# Patient Record
Sex: FEMALE | Race: WHITE | NOT HISPANIC OR LATINO | Employment: FULL TIME | URBAN - METROPOLITAN AREA
[De-identification: names, ages, dates, MRNs, and addresses within clinical notes are randomized per-mention and may not be internally consistent; named-entity substitution may affect disease eponyms.]

---

## 2023-03-10 PROBLEM — R60.0 EDEMA OF LOWER EXTREMITY: Status: ACTIVE | Noted: 2023-03-10

## 2023-03-10 PROBLEM — M75.20 BICEPS TENDONITIS: Status: ACTIVE | Noted: 2023-03-10

## 2023-03-10 PROBLEM — M25.879 ANKLE IMPINGEMENT SYNDROME: Status: ACTIVE | Noted: 2023-03-10

## 2023-03-10 PROBLEM — M75.100 ROTATOR CUFF TEAR: Status: ACTIVE | Noted: 2023-03-10

## 2023-03-10 PROBLEM — M54.2 NECK PAIN: Status: ACTIVE | Noted: 2023-03-10

## 2023-03-10 PROBLEM — M75.80 ROTATOR CUFF TENDINITIS: Status: ACTIVE | Noted: 2023-03-10

## 2023-03-10 PROBLEM — M19.079 PRIMARY LOCALIZED OSTEOARTHROSIS OF ANKLE AND FOOT: Status: ACTIVE | Noted: 2023-03-10

## 2023-03-10 PROBLEM — R00.2 HEART PALPITATIONS: Status: ACTIVE | Noted: 2023-03-10

## 2023-03-10 PROBLEM — R39.15 URGENCY OF URINATION: Status: ACTIVE | Noted: 2023-03-10

## 2023-03-10 PROBLEM — M79.673 PAIN IN HEEL: Status: ACTIVE | Noted: 2023-03-10

## 2023-03-10 PROBLEM — M17.10 OSTEOARTHROSIS, LOCALIZED, PRIMARY, INVOLVING LOWER LEG: Status: ACTIVE | Noted: 2023-03-10

## 2023-03-10 PROBLEM — M93.20 OSTEOCHONDRITIS DISSECANS: Status: ACTIVE | Noted: 2023-03-10

## 2023-03-10 PROBLEM — R01.1 HEART MURMUR: Status: ACTIVE | Noted: 2023-03-10

## 2023-03-10 PROBLEM — M23.90 INTERNAL DERANGEMENT OF KNEE: Status: ACTIVE | Noted: 2023-03-10

## 2023-03-10 PROBLEM — I10 ESSENTIAL HYPERTENSION: Status: ACTIVE | Noted: 2023-03-10

## 2023-03-10 PROBLEM — R32 URINARY INCONTINENCE IN FEMALE: Status: ACTIVE | Noted: 2023-03-10

## 2023-03-10 PROBLEM — I48.0 PAROXYSMAL ATRIAL FIBRILLATION (MULTI): Status: ACTIVE | Noted: 2023-03-10

## 2023-03-10 PROBLEM — R20.0 NUMBNESS IN FEET: Status: ACTIVE | Noted: 2023-03-10

## 2023-03-10 PROBLEM — S92.309A CLOSED FRACTURE OF METATARSAL BONE: Status: ACTIVE | Noted: 2023-03-10

## 2023-03-10 PROBLEM — S92.531A CLOSED DISPLACED FRACTURE OF DISTAL PHALANX OF LESSER TOE OF RIGHT FOOT: Status: ACTIVE | Noted: 2023-03-10

## 2023-03-10 PROBLEM — M25.569 KNEE PAIN: Status: ACTIVE | Noted: 2023-03-10

## 2023-03-10 PROBLEM — I47.19 ATRIAL TACHYCARDIA, PAROXYSMAL (CMS-HCC): Status: ACTIVE | Noted: 2023-03-10

## 2023-03-10 PROBLEM — S92.919A TOE FRACTURE: Status: ACTIVE | Noted: 2023-03-10

## 2023-03-10 PROBLEM — I47.10 PAROXYSMAL SVT (SUPRAVENTRICULAR TACHYCARDIA) (CMS-HCC): Status: ACTIVE | Noted: 2023-03-10

## 2023-03-10 PROBLEM — M25.819 SHOULDER IMPINGEMENT: Status: ACTIVE | Noted: 2023-03-10

## 2023-03-10 PROBLEM — M25.519 SHOULDER PAIN: Status: ACTIVE | Noted: 2023-03-10

## 2023-03-10 PROBLEM — M25.473 ANKLE EFFUSION: Status: ACTIVE | Noted: 2023-03-10

## 2023-03-10 PROBLEM — M25.579 ANKLE JOINT PAIN: Status: ACTIVE | Noted: 2023-03-10

## 2023-03-10 PROBLEM — N95.2 VAGINAL ATROPHY: Status: ACTIVE | Noted: 2023-03-10

## 2023-03-10 PROBLEM — R04.0 FREQUENT EPISTAXIS: Status: ACTIVE | Noted: 2023-03-10

## 2023-03-10 RX ORDER — DILTIAZEM HYDROCHLORIDE 120 MG/1
1 CAPSULE, EXTENDED RELEASE ORAL DAILY
COMMUNITY
Start: 2020-12-30 | End: 2024-02-07 | Stop reason: SDUPTHER

## 2023-03-10 RX ORDER — VALSARTAN 80 MG/1
120 TABLET ORAL DAILY
COMMUNITY
End: 2023-11-28

## 2023-03-10 RX ORDER — FLECAINIDE ACETATE 50 MG/1
1 TABLET ORAL 2 TIMES DAILY
COMMUNITY
Start: 2022-03-29 | End: 2023-11-28

## 2023-03-10 RX ORDER — HYDROCHLOROTHIAZIDE 12.5 MG/1
12.5 CAPSULE ORAL DAILY
COMMUNITY
End: 2024-03-11

## 2023-03-14 LAB
ANION GAP IN SER/PLAS: 11 MMOL/L (ref 10–20)
CALCIUM (MG/DL) IN SER/PLAS: 9.8 MG/DL (ref 8.6–10.3)
CARBON DIOXIDE, TOTAL (MMOL/L) IN SER/PLAS: 30 MMOL/L (ref 21–32)
CHLORIDE (MMOL/L) IN SER/PLAS: 104 MMOL/L (ref 98–107)
CHOLESTEROL (MG/DL) IN SER/PLAS: 184 MG/DL (ref 0–199)
CHOLESTEROL IN HDL (MG/DL) IN SER/PLAS: 50.6 MG/DL
CHOLESTEROL/HDL RATIO: 3.6
CREATININE (MG/DL) IN SER/PLAS: 1.02 MG/DL (ref 0.5–1.05)
ERYTHROCYTE DISTRIBUTION WIDTH (RATIO) BY AUTOMATED COUNT: 13.9 % (ref 11.5–14.5)
ERYTHROCYTE MEAN CORPUSCULAR HEMOGLOBIN CONCENTRATION (G/DL) BY AUTOMATED: 31.7 G/DL (ref 32–36)
ERYTHROCYTE MEAN CORPUSCULAR VOLUME (FL) BY AUTOMATED COUNT: 84 FL (ref 80–100)
ERYTHROCYTES (10*6/UL) IN BLOOD BY AUTOMATED COUNT: 4.09 X10E12/L (ref 4–5.2)
GFR FEMALE: 62 ML/MIN/1.73M2
GLUCOSE (MG/DL) IN SER/PLAS: 82 MG/DL (ref 74–99)
HEMATOCRIT (%) IN BLOOD BY AUTOMATED COUNT: 34.4 % (ref 36–46)
HEMOGLOBIN (G/DL) IN BLOOD: 10.9 G/DL (ref 12–16)
LDL: 116 MG/DL (ref 0–99)
LEUKOCYTES (10*3/UL) IN BLOOD BY AUTOMATED COUNT: 4.4 X10E9/L (ref 4.4–11.3)
MAGNESIUM (MG/DL) IN SER/PLAS: 2 MG/DL (ref 1.6–2.4)
PLATELETS (10*3/UL) IN BLOOD AUTOMATED COUNT: 283 X10E9/L (ref 150–450)
POTASSIUM (MMOL/L) IN SER/PLAS: 3.8 MMOL/L (ref 3.5–5.3)
SODIUM (MMOL/L) IN SER/PLAS: 141 MMOL/L (ref 136–145)
TRIGLYCERIDE (MG/DL) IN SER/PLAS: 86 MG/DL (ref 0–149)
UREA NITROGEN (MG/DL) IN SER/PLAS: 15 MG/DL (ref 6–23)
VLDL: 17 MG/DL (ref 0–40)

## 2023-03-19 ASSESSMENT — PROMIS GLOBAL HEALTH SCALE
RATE_AVERAGE_PAIN: 1
RATE_GENERAL_HEALTH: VERY GOOD
CARRYOUT_PHYSICAL_ACTIVITIES: COMPLETELY
RATE_MENTAL_HEALTH: EXCELLENT
CARRYOUT_SOCIAL_ACTIVITIES: EXCELLENT
RATE_QUALITY_OF_LIFE: EXCELLENT
RATE_AVERAGE_FATIGUE: MILD
EMOTIONAL_PROBLEMS: RARELY
RATE_PHYSICAL_HEALTH: VERY GOOD
RATE_SOCIAL_SATISFACTION: VERY GOOD

## 2023-03-21 ENCOUNTER — OFFICE VISIT (OUTPATIENT)
Dept: PRIMARY CARE | Facility: CLINIC | Age: 63
End: 2023-03-21
Payer: COMMERCIAL

## 2023-03-21 VITALS
BODY MASS INDEX: 28.25 KG/M2 | SYSTOLIC BLOOD PRESSURE: 120 MMHG | DIASTOLIC BLOOD PRESSURE: 70 MMHG | WEIGHT: 180 LBS | HEIGHT: 67 IN

## 2023-03-21 DIAGNOSIS — R20.0 NUMBNESS IN FEET: ICD-10-CM

## 2023-03-21 DIAGNOSIS — Z00.00 HEALTH CARE MAINTENANCE: Primary | ICD-10-CM

## 2023-03-21 DIAGNOSIS — I10 ESSENTIAL HYPERTENSION: ICD-10-CM

## 2023-03-21 DIAGNOSIS — I10 HYPERTENSION, UNSPECIFIED TYPE: ICD-10-CM

## 2023-03-21 DIAGNOSIS — R76.8 POSITIVE SM/RNP ANTIBODY: ICD-10-CM

## 2023-03-21 DIAGNOSIS — I48.0 PAROXYSMAL ATRIAL FIBRILLATION (MULTI): ICD-10-CM

## 2023-03-21 DIAGNOSIS — Z12.31 OTHER SCREENING MAMMOGRAM: ICD-10-CM

## 2023-03-21 DIAGNOSIS — R00.2 HEART PALPITATIONS: ICD-10-CM

## 2023-03-21 DIAGNOSIS — R43.0 ANOSMIA: ICD-10-CM

## 2023-03-21 DIAGNOSIS — M51.36 BULGING LUMBAR DISC: ICD-10-CM

## 2023-03-21 DIAGNOSIS — I47.19 ATRIAL TACHYCARDIA, PAROXYSMAL (CMS-HCC): ICD-10-CM

## 2023-03-21 PROCEDURE — 3078F DIAST BP <80 MM HG: CPT | Performed by: STUDENT IN AN ORGANIZED HEALTH CARE EDUCATION/TRAINING PROGRAM

## 2023-03-21 PROCEDURE — 1036F TOBACCO NON-USER: CPT | Performed by: STUDENT IN AN ORGANIZED HEALTH CARE EDUCATION/TRAINING PROGRAM

## 2023-03-21 PROCEDURE — 99204 OFFICE O/P NEW MOD 45 MIN: CPT | Performed by: STUDENT IN AN ORGANIZED HEALTH CARE EDUCATION/TRAINING PROGRAM

## 2023-03-21 PROCEDURE — 3074F SYST BP LT 130 MM HG: CPT | Performed by: STUDENT IN AN ORGANIZED HEALTH CARE EDUCATION/TRAINING PROGRAM

## 2023-03-21 ASSESSMENT — ENCOUNTER SYMPTOMS
SLEEP DISTURBANCE: 0
ABDOMINAL PAIN: 0
VOMITING: 0
SHORTNESS OF BREATH: 0
DIARRHEA: 0
FATIGUE: 1
NERVOUS/ANXIOUS: 1
DYSURIA: 0
SORE THROAT: 0
COUGH: 0
EYE PAIN: 0
CONSTIPATION: 0
NAUSEA: 1
UNEXPECTED WEIGHT CHANGE: 0
LIGHT-HEADEDNESS: 0
BACK PAIN: 0
DIZZINESS: 0
APPETITE CHANGE: 0

## 2023-03-21 NOTE — PROGRESS NOTES
"Subjective   Patient ID: Aristeo Hidalgo is a 62 y.o. female who presents for Referral.    Patient presents for elevated blood pressures and to establish a PCP. She records her blood pressures at home and they have been intermittently elevated this month. She has previously had elevated blood pressures at office visits, attributed to white coat hypertension, while other times has had normal blood pressures. Patient also notes of occasional nausea, fatigue and feeling \"off\" in her head this month. For the past few months, she has been experiencing intermittent shakiness and \"heart pounding\", but this has become worse this month. She is experiencing anxiety related to her symptoms and why her blood pressures are elevated, as she is worried she will have a stroke. The patient follows with Dr. Ramicone for pSVT and atrial tachycardia, for which she is on flecainide for, but no recent changes in dose. She saw cardiology this month, was started on valsartan and hydrochlorothiazide, and then had her valsartan dose increased 3 days ago. Denies sleep disturbance, chest pain, dyspnea, vomiting, dizziness, lightheadedness, diarrhea, change in weight, abdominal pain.     PMH: pSVT, atrial tachycardia, osteoarthritis  PSH: ovarian cyst removal, mandibular osteotomy, , arthroscopy of shoulder  SH: never smoker, drinks alcohol once a month, no drugs. 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 Goes to gym, active.   FH: skin cancer (mother), GI cancer (father), testicular cancer (brother), prostate cancer (brother)         Review of Systems   Constitutional:  Positive for fatigue. Negative for appetite change and unexpected weight change.   HENT:  Negative for sore throat.    Eyes:  Negative for pain.   Respiratory:  Negative for cough and shortness of breath.    Cardiovascular:  Negative for chest pain and leg swelling.        Heart pounding   Gastrointestinal:  Positive for nausea. Negative for abdominal pain, " "constipation, diarrhea and vomiting.   Genitourinary:  Negative for dysuria.   Musculoskeletal:  Negative for back pain.   Neurological:  Negative for dizziness, syncope and light-headedness.   Psychiatric/Behavioral:  Negative for sleep disturbance. The patient is nervous/anxious.    All other systems reviewed and are negative.      Objective   /70   Ht 1.702 m (5' 7\")   Wt 81.6 kg (180 lb)   BMI 28.19 kg/m²     Physical Exam  Vitals reviewed.   Constitutional:       General: She is not in acute distress.  HENT:      Head: Normocephalic and atraumatic.      Right Ear: External ear normal.      Left Ear: External ear normal.      Mouth/Throat:      Mouth: Mucous membranes are moist.   Eyes:      Conjunctiva/sclera: Conjunctivae normal.   Cardiovascular:      Rate and Rhythm: Normal rate and regular rhythm.      Heart sounds: Normal heart sounds.   Pulmonary:      Effort: Pulmonary effort is normal.      Breath sounds: Normal breath sounds.   Abdominal:      General: There is no distension.      Palpations: Abdomen is soft.      Tenderness: There is no abdominal tenderness.   Musculoskeletal:         General: No swelling.   Skin:     General: Skin is warm and dry.   Neurological:      General: No focal deficit present.      Mental Status: She is alert and oriented to person, place, and time.   Psychiatric:         Behavior: Behavior normal.         Assessment/Plan   Problem List Items Addressed This Visit          Circulatory    Atrial tachycardia, paroxysmal (CMS/HCC)    Essential hypertension    Heart palpitations    Paroxysmal atrial fibrillation (CMS/HCC)     Other Visit Diagnoses       Health care maintenance    -  Primary    Relevant Orders    TSH with reflex to Free T4 if abnormal    Vitamin D 25-Hydroxy,Total    Referral to Gynecology    Other screening mammogram        Relevant Orders    BI mammo bilateral screening tomosynthesis    Hypertension, unspecified type        Relevant Orders    Basic " "metabolic panel    Anosmia        Relevant Orders    MR brain wo IV contrast          #Hypertension  -Blood pressures well controlled currently, may have some component of anxiety contributing as well. Continue diltiazem. Was started on valsartan and hydrochlorothiazide this month by cardiology.   -Order BMP, TSH  -consider workup for secondary causes if poorly controlled    #pSVT and atrial tachycardia  -Follows with Dr. Ramicone. On flecainide and diltiazem.  Could be related to underlying symptoms, consider cardiac event monitor follow [facility] cardiology    #anosmnia  #numbness  Patient having non specific symptoms, feels overall unwell and \"off\" does endorse some vision changes at times that comes and goes, as well as non specific neurological symptoms. Has had full cardiac workup and follows with cardiology, could be stress/anxiety related however, pt has not had recent stressor or no changes in events  Obtain MRI brain, encourage to monitor sympotms and to call if she is to have new onset sympotms or can correlate with specific incidence    #Health maintenance  -Labs as above, and vitamin D. Recent labs from cardiology office this month reviewed, no significant abnormalities.   -Has not had her shingles vaccine. UTD on COVID.   -Believes her next colonoscopy is due in 2 years. Unknown last mammogram and pap, \"a few years ago\". Will order repeat mammogram and GYN referral.      RTC in 3-6 months or prn      "

## 2023-03-24 ENCOUNTER — LAB (OUTPATIENT)
Dept: LAB | Facility: LAB | Age: 63
End: 2023-03-24
Payer: COMMERCIAL

## 2023-03-24 DIAGNOSIS — Z00.00 HEALTH CARE MAINTENANCE: ICD-10-CM

## 2023-03-24 DIAGNOSIS — I10 HYPERTENSION, UNSPECIFIED TYPE: ICD-10-CM

## 2023-03-24 LAB
ANION GAP IN SER/PLAS: 13 MMOL/L (ref 10–20)
ANTI-CENTROMERE: <0.2 AI
ANTI-CHROMATIN: <0.2 AI
ANTI-DNA (DS): <1 IU/ML
ANTI-JO-1 IGG: <0.2 AI
ANTI-RIBOSOMAL P: <0.2 AI
ANTI-RNP: 1.3 AI
ANTI-SCL-70: <0.2 AI
ANTI-SM/RNP: <0.2 AI
ANTI-SM: <0.2 AI
ANTI-SSA: <0.2 AI
ANTI-SSB: <0.2 AI
CALCIDIOL (25 OH VITAMIN D3) (NG/ML) IN SER/PLAS: 29 NG/ML
CALCIUM (MG/DL) IN SER/PLAS: 9.5 MG/DL (ref 8.6–10.3)
CARBON DIOXIDE, TOTAL (MMOL/L) IN SER/PLAS: 28 MMOL/L (ref 21–32)
CHLORIDE (MMOL/L) IN SER/PLAS: 103 MMOL/L (ref 98–107)
COBALAMIN (VITAMIN B12) (PG/ML) IN SER/PLAS: 576 PG/ML (ref 211–911)
CREATININE (MG/DL) IN SER/PLAS: 0.86 MG/DL (ref 0.5–1.05)
FOLATE (NG/ML) IN SER/PLAS: >24 NG/ML
GFR FEMALE: 76 ML/MIN/1.73M2
GLUCOSE (MG/DL) IN SER/PLAS: 86 MG/DL (ref 74–99)
POTASSIUM (MMOL/L) IN SER/PLAS: 4.2 MMOL/L (ref 3.5–5.3)
SODIUM (MMOL/L) IN SER/PLAS: 140 MMOL/L (ref 136–145)
UREA NITROGEN (MG/DL) IN SER/PLAS: 21 MG/DL (ref 6–23)

## 2023-03-24 PROCEDURE — 86039 ANTINUCLEAR ANTIBODIES (ANA): CPT

## 2023-03-24 PROCEDURE — 86225 DNA ANTIBODY NATIVE: CPT

## 2023-03-24 PROCEDURE — 82306 VITAMIN D 25 HYDROXY: CPT

## 2023-03-24 PROCEDURE — 82607 VITAMIN B-12: CPT

## 2023-03-24 PROCEDURE — 86038 ANTINUCLEAR ANTIBODIES: CPT

## 2023-03-24 PROCEDURE — 80048 BASIC METABOLIC PNL TOTAL CA: CPT

## 2023-03-24 PROCEDURE — 82746 ASSAY OF FOLIC ACID SERUM: CPT

## 2023-03-24 PROCEDURE — 36415 COLL VENOUS BLD VENIPUNCTURE: CPT

## 2023-03-24 PROCEDURE — 86235 NUCLEAR ANTIGEN ANTIBODY: CPT

## 2023-03-28 LAB
ANA PATTERN: ABNORMAL
ANA TITER: ABNORMAL
ANTI-NUCLEAR ANTIBODY (ANA): POSITIVE

## 2023-04-10 ENCOUNTER — APPOINTMENT (OUTPATIENT)
Dept: LAB | Facility: LAB | Age: 63
End: 2023-04-10
Payer: COMMERCIAL

## 2023-04-10 LAB
ANTICARDIOLIPIN IGA ANTIBODY: 1.7 APL U/ML (ref 0–20)
ANTICARDIOLIPIN IGG ANTIBODY: <1.6 GPL U/ML (ref 0–20)
ANTICARDIOLIPIN IGM ANTIBODY: 15.5 MPL U/ML (ref 0–20)
BETA 2 GLYCOPROTEIN 1 IGA AB IN SERUM: 1.6 U/ML (ref 0–20)
BETA 2 GLYCOPROTEIN 1 IGG AB IN SERUM: <1.4 U/ML (ref 0–20)
BETA 2 GLYCOPROTEIN 1 IGM ANTIBODY IN SERUM: 15.6 U/ML (ref 0–20)
COMPLEMENT C3 (MG/DL) IN SER/PLAS: 130 MG/DL (ref 87–200)
COMPLEMENT C4 (MG/DL) IN SER/PLAS: 32 MG/DL (ref 10–50)
CREATININE (MG/DL) IN URINE: 38.7 MG/DL (ref 20–320)
PROTEIN (MG/DL) IN URINE: 4 MG/DL (ref 5–24)
PROTEIN/CREATININE (MG/MG) IN URINE: 0.1 MG/MG CREAT (ref 0–0.17)
RHEUMATOID FACTOR (IU/ML) IN SERUM OR PLASMA: <10 IU/ML (ref 0–15)
THYROTROPIN (MIU/L) IN SER/PLAS BY DETECTION LIMIT <= 0.05 MIU/L: 1.86 MIU/L (ref 0.44–3.98)

## 2023-04-10 PROCEDURE — 84443 ASSAY THYROID STIM HORMONE: CPT

## 2023-04-10 PROCEDURE — 36415 COLL VENOUS BLD VENIPUNCTURE: CPT

## 2023-04-12 LAB
ALBUMIN ELP: 4.4 G/DL (ref 3.4–5)
ALPHA 1: 0.3 G/DL (ref 0.2–0.6)
ALPHA 2: 0.8 G/DL (ref 0.4–1.1)
ANTI-HISTONE ANTIBODIES: 0.8 UNITS (ref 0–0.9)
BETA: 0.9 G/DL (ref 0.5–1.2)
CITRULLINE ANTIBODY, IGG: 4 UNITS (ref 0–19)
GAMMA GLOBULIN: 0.9 G/DL (ref 0.5–1.4)
PATH REVIEW-SERUM PROTEIN ELECTROPHORESIS: NORMAL
PROTEIN ELECTROPHORESIS INTERPRETATION: NORMAL
PROTEIN TOTAL: 7.3 G/DL (ref 6.4–8.2)

## 2023-04-13 LAB
DILUTE RUSSELL VIPER VENOM TIME CONF: 1.02 RATIO
DILUTE RUSSELL VIPER VENOM TIME SCREEN: 0.98 RATIO
DILUTE RUSSELL VIPER VENOM TIME TEST RATIO: 0.96 RATIO
LUPUS ANTICOAGULANT INTERPRETATION: NORMAL
NORMALIZED SILICA CLOTTING TIME (RATIO) OF PPP: 1.01 RATIO
SILICA CLOTTING TIME CONFIRMATION: 1.03 RATIO
SILICA CLOTTING TIME SCREEN: 1.05 RATIO

## 2023-11-13 ENCOUNTER — OFFICE VISIT (OUTPATIENT)
Dept: OPHTHALMOLOGY | Facility: CLINIC | Age: 63
End: 2023-11-13
Payer: COMMERCIAL

## 2023-11-13 DIAGNOSIS — H52.4 PRESBYOPIA: ICD-10-CM

## 2023-11-13 DIAGNOSIS — H25.811 COMBINED FORM OF AGE-RELATED CATARACT, RIGHT EYE: ICD-10-CM

## 2023-11-13 DIAGNOSIS — H00.15 CHALAZION OF LEFT LOWER EYELID: Primary | ICD-10-CM

## 2023-11-13 DIAGNOSIS — H11.222 PYOGENIC GRANULOMA OF LEFT CONJUNCTIVA: ICD-10-CM

## 2023-11-13 DIAGNOSIS — H25.812 COMBINED FORM OF AGE-RELATED CATARACT, LEFT EYE: ICD-10-CM

## 2023-11-13 DIAGNOSIS — H52.203 ASTIGMATISM OF BOTH EYES, UNSPECIFIED TYPE: ICD-10-CM

## 2023-11-13 PROCEDURE — 99203 OFFICE O/P NEW LOW 30 MIN: CPT | Performed by: OPHTHALMOLOGY

## 2023-11-13 RX ORDER — PREDNISOLONE ACETATE 10 MG/ML
SUSPENSION/ DROPS OPHTHALMIC
Qty: 5 ML | Refills: 2 | Status: SHIPPED | OUTPATIENT
Start: 2023-11-13

## 2023-11-13 ASSESSMENT — CUP TO DISC RATIO
OD_RATIO: 0.4
OS_RATIO: 0.35

## 2023-11-13 ASSESSMENT — REFRACTION_WEARINGRX
OS_ADD: +2.25
OD_CYLINDER: -1.00
OD_AXIS: 005
OD_ADD: +2.25
OS_CYLINDER: -1.00
OS_SPHERE: -0.50
OD_SPHERE: PLANO
OS_AXIS: 160

## 2023-11-13 ASSESSMENT — VISUAL ACUITY
OD_CC: 20/20
OS_CC: 20/20
CORRECTION_TYPE: GLASSES
METHOD: SNELLEN - LINEAR

## 2023-11-13 ASSESSMENT — ENCOUNTER SYMPTOMS
GASTROINTESTINAL NEGATIVE: 0
PSYCHIATRIC NEGATIVE: 0
ALLERGIC/IMMUNOLOGIC NEGATIVE: 0
CARDIOVASCULAR NEGATIVE: 0
CONSTITUTIONAL NEGATIVE: 0
NEUROLOGICAL NEGATIVE: 0
ENDOCRINE NEGATIVE: 0
RESPIRATORY NEGATIVE: 0
HEMATOLOGIC/LYMPHATIC NEGATIVE: 0
MUSCULOSKELETAL NEGATIVE: 0
EYES NEGATIVE: 1

## 2023-11-13 ASSESSMENT — SLIT LAMP EXAM - LIDS: COMMENTS: GOOD POSITION

## 2023-11-13 ASSESSMENT — TONOMETRY
IOP_METHOD: GOLDMANN APPLANATION
OD_IOP_MMHG: 17
OS_IOP_MMHG: 17

## 2023-11-13 ASSESSMENT — EXTERNAL EXAM - RIGHT EYE: OD_EXAM: NORMAL

## 2023-11-13 ASSESSMENT — EXTERNAL EXAM - LEFT EYE: OS_EXAM: NORMAL

## 2023-11-13 NOTE — PROGRESS NOTES
Assessment/Plan   Diagnoses and all orders for this visit:    Chalazion of left lower eyelid  Pyogenic granuloma of left conjunctiva  -Present for about 6 weeks - started out a little larger, no pain, (+)redness  -Recommended to continue warm compresses 2-4 times a day  -Start prednisolone acetate 1% OS QID x 2 weeks, then BID until next visit.  -Discussed potential ocular side effects of topical steroids including increased IOP/glaucoma and cataract progression with long term use and advised to use as directed.  -F/u 3-4 weeks, IOP check. Will consider referral to oculoplastic surgeon as needed if no resolution.     Combined form of age-related cataract, right eye  Combined form of age-related cataract, left eye  -Not visually significant at this time. Monitor.     Astigmatism of both eyes, unspecified type  Presbyopia  -Continue current glasses      No history of intraocular surgery/refractive surgery.   No FH of AMD/glaucoma

## 2023-11-27 DIAGNOSIS — I48.0 PAROXYSMAL ATRIAL FIBRILLATION (MULTI): ICD-10-CM

## 2023-11-27 DIAGNOSIS — I10 ESSENTIAL (PRIMARY) HYPERTENSION: ICD-10-CM

## 2023-11-28 RX ORDER — FLECAINIDE ACETATE 50 MG/1
50 TABLET ORAL 2 TIMES DAILY
Qty: 180 TABLET | Refills: 3 | Status: SHIPPED | OUTPATIENT
Start: 2023-11-28

## 2023-11-28 RX ORDER — VALSARTAN 80 MG/1
80 TABLET ORAL DAILY
Qty: 90 TABLET | Refills: 2 | Status: SHIPPED | OUTPATIENT
Start: 2023-11-28 | End: 2023-12-05 | Stop reason: SDUPTHER

## 2023-12-03 ASSESSMENT — CUP TO DISC RATIO
OS_RATIO: 0.35
OD_RATIO: 0.4

## 2023-12-03 ASSESSMENT — EXTERNAL EXAM - RIGHT EYE: OD_EXAM: NORMAL

## 2023-12-03 ASSESSMENT — SLIT LAMP EXAM - LIDS: COMMENTS: GOOD POSITION

## 2023-12-03 ASSESSMENT — EXTERNAL EXAM - LEFT EYE: OS_EXAM: NORMAL

## 2023-12-03 NOTE — PROGRESS NOTES
Assessment/Plan   Diagnoses and all orders for this visit:    Chalazion of left lower eyelid  Pyogenic granuloma of left conjunctiva  -Present for about 9 weeks - started out a little larger, no pain, (+)redness  -Recommended to continue warm compresses 2-4 times a day  -Started prednisolone acetate 1% OS QID x 2 weeks, then BID - less inflamed, but incomplete resolution. Recommended to continue prednisolone BID for 1 more week, then stop. Would recommend I&C - will refer.     Combined form of age-related cataract, right eye  Combined form of age-related cataract, left eye  -Not visually significant at this time. Monitor.     Astigmatism of both eyes, unspecified type  Presbyopia  -Continue current glasses      No history of intraocular surgery/refractive surgery.   No FH of AMD/glaucoma

## 2023-12-04 ENCOUNTER — OFFICE VISIT (OUTPATIENT)
Dept: OPHTHALMOLOGY | Facility: CLINIC | Age: 63
End: 2023-12-04
Payer: COMMERCIAL

## 2023-12-04 DIAGNOSIS — H25.811 COMBINED FORM OF AGE-RELATED CATARACT, RIGHT EYE: ICD-10-CM

## 2023-12-04 DIAGNOSIS — H52.203 ASTIGMATISM OF BOTH EYES, UNSPECIFIED TYPE: ICD-10-CM

## 2023-12-04 DIAGNOSIS — H00.15 CHALAZION OF LEFT LOWER EYELID: Primary | ICD-10-CM

## 2023-12-04 DIAGNOSIS — H11.222 PYOGENIC GRANULOMA OF LEFT CONJUNCTIVA: ICD-10-CM

## 2023-12-04 DIAGNOSIS — H25.812 COMBINED FORM OF AGE-RELATED CATARACT, LEFT EYE: ICD-10-CM

## 2023-12-04 DIAGNOSIS — H52.4 PRESBYOPIA: ICD-10-CM

## 2023-12-04 PROCEDURE — 99213 OFFICE O/P EST LOW 20 MIN: CPT | Performed by: OPHTHALMOLOGY

## 2023-12-04 ASSESSMENT — TONOMETRY
IOP_METHOD: GOLDMANN APPLANATION
OD_IOP_MMHG: 14
OS_IOP_MMHG: 15

## 2023-12-04 ASSESSMENT — VISUAL ACUITY
METHOD: SNELLEN - LINEAR
OS_CC: 20/30-1
OD_CC: 20/25

## 2023-12-04 ASSESSMENT — ENCOUNTER SYMPTOMS: EYES NEGATIVE: 1

## 2023-12-05 DIAGNOSIS — I10 ESSENTIAL (PRIMARY) HYPERTENSION: ICD-10-CM

## 2023-12-05 RX ORDER — VALSARTAN 80 MG/1
120 TABLET ORAL DAILY
Qty: 140 TABLET | Refills: 3 | Status: SHIPPED | OUTPATIENT
Start: 2023-12-05 | End: 2024-02-07 | Stop reason: DRUGHIGH

## 2023-12-28 ENCOUNTER — PREP FOR PROCEDURE (OUTPATIENT)
Dept: OPHTHALMOLOGY | Facility: HOSPITAL | Age: 63
End: 2023-12-28
Payer: COMMERCIAL

## 2023-12-28 DIAGNOSIS — H00.15 CHALAZION OF LEFT LOWER EYELID: Primary | ICD-10-CM

## 2023-12-29 ENCOUNTER — HOSPITAL ENCOUNTER (OUTPATIENT)
Facility: HOSPITAL | Age: 63
Setting detail: OUTPATIENT SURGERY
End: 2023-12-29
Attending: OPHTHALMOLOGY | Admitting: OPHTHALMOLOGY
Payer: COMMERCIAL

## 2023-12-29 ENCOUNTER — PREP FOR PROCEDURE (OUTPATIENT)
Dept: OPHTHALMOLOGY | Facility: HOSPITAL | Age: 63
End: 2023-12-29
Payer: COMMERCIAL

## 2024-01-08 ENCOUNTER — TELEPHONE (OUTPATIENT)
Dept: OPHTHALMOLOGY | Facility: HOSPITAL | Age: 64
End: 2024-01-08
Payer: COMMERCIAL

## 2024-01-08 NOTE — TELEPHONE ENCOUNTER
Called [phone] on this date to schedule lid procedure. Left detailed vm with my call back instructions for patient to call at her convenience to schedule procedure.

## 2024-01-10 ENCOUNTER — TELEPHONE (OUTPATIENT)
Dept: OPHTHALMOLOGY | Facility: CLINIC | Age: 64
End: 2024-01-10
Payer: COMMERCIAL

## 2024-02-01 PROBLEM — D22.5 MELANOCYTIC NEVI OF TRUNK: Status: ACTIVE | Noted: 2020-04-11

## 2024-02-01 PROBLEM — M25.473 ANKLE EFFUSION: Status: RESOLVED | Noted: 2023-03-10 | Resolved: 2024-02-01

## 2024-02-01 PROBLEM — M51.36 BULGING LUMBAR DISC: Status: ACTIVE | Noted: 2024-02-01

## 2024-02-01 PROBLEM — R04.0 FREQUENT EPISTAXIS: Status: RESOLVED | Noted: 2023-03-10 | Resolved: 2024-02-01

## 2024-02-01 PROBLEM — I20.89 ANGINAL EQUIVALENT (CMS-HCC): Status: ACTIVE | Noted: 2024-02-01

## 2024-02-01 PROBLEM — R76.8 POSITIVE ANA (ANTINUCLEAR ANTIBODY): Status: ACTIVE | Noted: 2024-02-01

## 2024-02-01 PROBLEM — D48.5 NEOPLASM OF UNCERTAIN BEHAVIOR OF SKIN: Status: ACTIVE | Noted: 2020-04-11

## 2024-02-01 PROBLEM — M51.369 BULGING LUMBAR DISC: Status: ACTIVE | Noted: 2024-02-01

## 2024-02-01 PROBLEM — L82.1 OTHER SEBORRHEIC KERATOSIS: Status: ACTIVE | Noted: 2020-04-11

## 2024-02-01 PROBLEM — R06.09 EXERTIONAL DYSPNEA: Status: ACTIVE | Noted: 2024-02-01

## 2024-02-07 ENCOUNTER — OFFICE VISIT (OUTPATIENT)
Dept: CARDIOLOGY | Facility: CLINIC | Age: 64
End: 2024-02-07
Payer: COMMERCIAL

## 2024-02-07 VITALS
DIASTOLIC BLOOD PRESSURE: 78 MMHG | WEIGHT: 176 LBS | SYSTOLIC BLOOD PRESSURE: 120 MMHG | BODY MASS INDEX: 27.62 KG/M2 | OXYGEN SATURATION: 96 % | HEIGHT: 67 IN | HEART RATE: 66 BPM

## 2024-02-07 DIAGNOSIS — I10 ESSENTIAL (PRIMARY) HYPERTENSION: ICD-10-CM

## 2024-02-07 DIAGNOSIS — I47.19 ATRIAL TACHYCARDIA, PAROXYSMAL (CMS-HCC): ICD-10-CM

## 2024-02-07 DIAGNOSIS — I10 ESSENTIAL HYPERTENSION: Primary | ICD-10-CM

## 2024-02-07 PROCEDURE — 3078F DIAST BP <80 MM HG: CPT | Performed by: INTERNAL MEDICINE

## 2024-02-07 PROCEDURE — 1036F TOBACCO NON-USER: CPT | Performed by: INTERNAL MEDICINE

## 2024-02-07 PROCEDURE — 99214 OFFICE O/P EST MOD 30 MIN: CPT | Performed by: INTERNAL MEDICINE

## 2024-02-07 PROCEDURE — 3074F SYST BP LT 130 MM HG: CPT | Performed by: INTERNAL MEDICINE

## 2024-02-07 PROCEDURE — 93000 ELECTROCARDIOGRAM COMPLETE: CPT | Performed by: INTERNAL MEDICINE

## 2024-02-07 RX ORDER — VALSARTAN 80 MG/1
80 TABLET ORAL DAILY
Qty: 140 TABLET | Refills: 3 | Status: SHIPPED
Start: 2024-02-07

## 2024-02-07 NOTE — PROGRESS NOTES
"History Of Present Illness:      This is a 63-year-old female with a history of supraventricular tachycardia.  She has been monitoring her blood pressure at home and at times she has felt lightheaded.  I reviewed her blood pressure readings and the minimum blood pressure was 105/75.  Occasionally she will have a blood pressure spike up to a maximum of 154/89 based on review of her home readings.  She has a history of atrial tachycardia but has only had rare palpitations.  Otherwise no cardiac complaints and no side effects related to the use of flecainide.    Review of Systems  Other review of systems negative     Last Recorded Vitals:      3/6/2023     9:08 AM 3/6/2023     9:24 AM 3/21/2023     1:29 PM 4/10/2023     9:19 AM 5/18/2023     9:07 AM 8/11/2023     9:08 AM 2/7/2024     9:16 AM   Vitals   Systolic 148 166 120 118 132 110 120   Diastolic 90 94 70 76 88 76 78   Heart Rate 71   67 71 75 66   Resp     16     Height (in) 1.702 m (5' 7\")  1.702 m (5' 7\") 1.702 m (5' 7\") 1.702 m (5' 7\") 1.702 m (5' 7\") 1.702 m (5' 7\")   Weight (lb) 183  180 186 188 183 176   BMI 28.66 kg/m2  28.19 kg/m2 29.13 kg/m2 29.44 kg/m2 28.66 kg/m2 27.57 kg/m2   BSA (m2) 1.98 m2  1.96 m2 2 m2 2.01 m2 1.98 m2 1.94 m2   Visit Report   Report    Report          Allergies:  Propoxyphene n-acetaminophen    Outpatient Medications:  Current Outpatient Medications   Medication Instructions    dilTIAZem XR (DILACOR XR) 120 mg, oral, Daily    flecainide (TAMBOCOR) 50 mg, oral, 2 times daily    hydroCHLOROthiazide (MICROZIDE) 12.5 mg, oral, Daily    prednisoLONE acetate (Pred-Forte) 1 % ophthalmic suspension 1 drop to left eye 4 times a day for 2 weeks, then 2 times a day until next visit    valsartan (DIOVAN) 120 mg, oral, Daily       Physical Exam:    General Appearance:  Alert, oriented, no distress  Skin:  Warm and dry  Head and Neck:  No elevation of JVP, no carotid bruits  Cardiac Exam:  Rhythm is regular, S1 and S2 are normal, grade I/VI " systolic murmur LSB  Lungs:  Clear to auscultation  Extremities:  no edema  Neurologic:  No focal deficits  Psychiatric:  Appropriate mood and behavior         Cardiology Tests:  I have personally review the diagnostic cardiac testing and my interpretation is as follows:    EKG:  NSR with no ischemic changes   Echo Aug 2023:  Normal LV with mild TR  Coronary artery calcium score June 2023: 0    Assessment/Plan   Problem List Items Addressed This Visit             ICD-10-CM    Atrial tachycardia, paroxysmal I47.19     1.  Paroxysmal atrial tachycardia:  Aristeo is doing well on flecainide.  She has had no symptomatic recurrences of this arrhythmia and has been minimally symptomatic on flecainide.  Follow-up in 1 year, or sooner if necessary.    2.  Hypertension: The patient has had spells of lightheadedness and periods of hypotension with a minimum blood pressure 105/75 based on my review of her home blood pressure readings.  I have recommended that she decrease the Diovan 80 mg tablets to 1/2 tablet daily and continue to monitor her blood pressure.         Relevant Orders    ECG 12 lead (Clinic Performed)    Essential hypertension - Primary I10     Other Visit Diagnoses         Codes    Essential (primary) hypertension     I10    Relevant Medications    valsartan (Diovan) 80 mg tablet            James C Ramicone, DO

## 2024-02-08 NOTE — ASSESSMENT & PLAN NOTE
1.  Paroxysmal atrial tachycardia:  Aristeo is doing well on flecainide.  She has had no symptomatic recurrences of this arrhythmia and has been minimally symptomatic on flecainide.  Follow-up in 1 year, or sooner if necessary.    2.  Hypertension: The patient has had spells of lightheadedness and periods of hypotension with a minimum blood pressure 105/75 based on my review of her home blood pressure readings.  I have recommended that she decrease the Diovan 80 mg tablets to 1/2 tablet daily and continue to monitor her blood pressure.

## 2024-03-10 DIAGNOSIS — I47.10 SUPRAVENTRICULAR TACHYCARDIA (CMS-HCC): ICD-10-CM

## 2024-03-11 RX ORDER — HYDROCHLOROTHIAZIDE 12.5 MG/1
12.5 CAPSULE ORAL DAILY
Qty: 90 CAPSULE | Refills: 3 | Status: SHIPPED | OUTPATIENT
Start: 2024-03-11

## 2024-07-03 ENCOUNTER — HOSPITAL ENCOUNTER (OUTPATIENT)
Dept: RADIOLOGY | Facility: CLINIC | Age: 64
Discharge: HOME | End: 2024-07-03
Payer: COMMERCIAL

## 2024-07-03 ENCOUNTER — APPOINTMENT (OUTPATIENT)
Dept: PODIATRY | Facility: CLINIC | Age: 64
End: 2024-07-03
Payer: COMMERCIAL

## 2024-07-03 DIAGNOSIS — M79.672 FOOT PAIN, BILATERAL: Primary | ICD-10-CM

## 2024-07-03 DIAGNOSIS — M20.5X1 ACQUIRED HALLUX LIMITUS OF RIGHT FOOT: ICD-10-CM

## 2024-07-03 DIAGNOSIS — M79.671 FOOT PAIN, BILATERAL: Primary | ICD-10-CM

## 2024-07-03 DIAGNOSIS — M20.41 HAMMERTOE OF RIGHT FOOT: ICD-10-CM

## 2024-07-03 DIAGNOSIS — M20.11 HALLUX VALGUS, BILATERAL: ICD-10-CM

## 2024-07-03 DIAGNOSIS — M79.672 FOOT PAIN, BILATERAL: ICD-10-CM

## 2024-07-03 DIAGNOSIS — M20.12 HALLUX VALGUS, BILATERAL: ICD-10-CM

## 2024-07-03 DIAGNOSIS — M79.671 FOOT PAIN, BILATERAL: ICD-10-CM

## 2024-07-03 PROCEDURE — 73630 X-RAY EXAM OF FOOT: CPT | Mod: BILATERAL PROCEDURE | Performed by: RADIOLOGY

## 2024-07-03 PROCEDURE — 73630 X-RAY EXAM OF FOOT: CPT | Mod: 50

## 2024-07-03 PROCEDURE — 1036F TOBACCO NON-USER: CPT | Performed by: PODIATRIST

## 2024-07-03 PROCEDURE — 99204 OFFICE O/P NEW MOD 45 MIN: CPT | Performed by: PODIATRIST

## 2024-07-03 ASSESSMENT — ENCOUNTER SYMPTOMS
ENDOCRINE NEGATIVE: 1
RESPIRATORY NEGATIVE: 1
PALPITATIONS: 1
HEMATOLOGIC/LYMPHATIC NEGATIVE: 1
GASTROINTESTINAL NEGATIVE: 1
ALLERGIC/IMMUNOLOGIC NEGATIVE: 1
EYES NEGATIVE: 1
PSYCHIATRIC NEGATIVE: 1
CONSTITUTIONAL NEGATIVE: 1

## 2024-07-03 NOTE — PROGRESS NOTES
"Chief Complaint   Patient presents with    Toe Pain     New Patient is here today for toe pain 2nd and 3rd toe CARL, more so right foot. No recent xrays taken. Denies of injury or trama. Has some numbness in between the two toes CARL.        Patient is concerned about \"Space b/t 2nd and 3rd toes right foot\" no real pain but feels like she might be walking on a pebble or something like that.  No tingling no radiating discomfort.  No known trauma.  Bunion is asymptomatic.  Hammertoe more recently noticed.  This too is asymptomatic.  Duration a few months.  Mild deviation great toe on the left foot.  No pain with this.  No splaying noted.  Remote ORIF left ankle.    No diabetes.  None smoker.  Retired nurse.  Currently working in an office.    Review of Systems   Constitutional: Negative.    HENT: Negative.     Eyes: Negative.    Respiratory: Negative.     Cardiovascular:  Positive for palpitations.   Gastrointestinal: Negative.    Endocrine: Negative.    Genitourinary: Negative.    Musculoskeletal:  Positive for gait problem.        Foot pain   Skin: Negative.    Allergic/Immunologic: Negative.    Hematological: Negative.    Psychiatric/Behavioral: Negative.       General/Constitutional: Alert. NAD.   Respiratory: Non labored breathing.   Psychiatric: Mood and affect normal/baseline.   HEENT: Sclera clear. Wearing corrective lenses.  Dermatologic: Nails and skin intact no acute inflammatory infectious process. Web spaces are dry. No ulcers no pre-ulcerative areas.  Vascular: Pedal pulses are intact and symmetric including the dorsalis pedis and the posterior tibial pulses. Feet are warm to touch. No swelling appreciated either extremity.  Neurological: Alert and oriented. No acute distress. No sensory impairment bilateral.  Musculoskeletal: Strength is normal for age. No acute deficits appreciated.  Hallux valgus right foot greater than left foot.  Hallux limitus right great toe joint.  No crepitation no joint pain " noted.  Exostosis palpable.  Rigid hammertoe deformity second digit right foot.  Displacement from the great toe.  There is some gapping between the second and third digits of the right foot.  No neuroma type pain.  No palpable discomfort.  Range of motion causes no pain.  Much less so findings on the left foot including mild hallux valgus.    X-rays: DJD joint narrowing exostosis right great toe joint.  Hammertoe deformity.  Minimal hallux valgus on the left side.    Impression: Hallux valgus deformity with hammertoe deformity of the right foot.    -Today's treatment and course of therapy was discussed with the patient in detail. Patient's questions were answered. Proper foot care was discussed. This dictation was done using Dragon computer software and as such may contain grammatical errors.    -Lengthy discussion on x-ray findings.  Clinical pathology of each reviewed.    -Conservative measures discussed including proper shoe gear avoid high heels avoid tight shoes avoid narrow shoes.  Soft comfortable shoes would be ideal.    -This condition may progress with further discuss surgical intervention at that time.  This was briefly discussed at this time.  It does appear that you are able to function with no restrictions as I would say activity as tolerated.  Follow-up as needed.

## 2024-08-18 DIAGNOSIS — I10 ESSENTIAL (PRIMARY) HYPERTENSION: ICD-10-CM

## 2024-08-20 RX ORDER — VALSARTAN 80 MG/1
80 TABLET ORAL DAILY
Qty: 90 TABLET | Refills: 1 | Status: SHIPPED | OUTPATIENT
Start: 2024-08-20

## 2024-11-24 DIAGNOSIS — I48.0 PAROXYSMAL ATRIAL FIBRILLATION (MULTI): ICD-10-CM

## 2024-11-26 RX ORDER — FLECAINIDE ACETATE 50 MG/1
50 TABLET ORAL 2 TIMES DAILY
Qty: 180 TABLET | Refills: 1 | Status: SHIPPED | OUTPATIENT
Start: 2024-11-26

## 2024-12-01 DIAGNOSIS — I10 ESSENTIAL (PRIMARY) HYPERTENSION: ICD-10-CM

## 2024-12-02 RX ORDER — DILTIAZEM HYDROCHLORIDE 120 MG/1
120 CAPSULE, EXTENDED RELEASE ORAL DAILY
Qty: 90 CAPSULE | Refills: 3 | Status: SHIPPED | OUTPATIENT
Start: 2024-12-02

## 2025-01-17 PROBLEM — N83.209 OVARIAN CYST: Status: ACTIVE | Noted: 2025-01-17

## 2025-02-10 ENCOUNTER — APPOINTMENT (OUTPATIENT)
Dept: CARDIOLOGY | Facility: CLINIC | Age: 65
End: 2025-02-10
Payer: COMMERCIAL

## 2025-02-13 DIAGNOSIS — I10 ESSENTIAL (PRIMARY) HYPERTENSION: ICD-10-CM

## 2025-02-14 RX ORDER — VALSARTAN 80 MG/1
80 TABLET ORAL DAILY
Qty: 90 TABLET | Refills: 3 | Status: SHIPPED | OUTPATIENT
Start: 2025-02-14

## 2025-02-16 DIAGNOSIS — I47.10 SUPRAVENTRICULAR TACHYCARDIA (CMS-HCC): ICD-10-CM

## 2025-02-17 RX ORDER — HYDROCHLOROTHIAZIDE 12.5 MG/1
12.5 CAPSULE ORAL DAILY
Qty: 90 CAPSULE | Refills: 3 | Status: SHIPPED | OUTPATIENT
Start: 2025-02-17

## 2025-02-23 DIAGNOSIS — I48.0 PAROXYSMAL ATRIAL FIBRILLATION (MULTI): ICD-10-CM

## 2025-02-25 RX ORDER — FLECAINIDE ACETATE 50 MG/1
50 TABLET ORAL 2 TIMES DAILY
Qty: 180 TABLET | Refills: 0 | Status: SHIPPED | OUTPATIENT
Start: 2025-02-25

## 2025-04-02 ENCOUNTER — APPOINTMENT (OUTPATIENT)
Dept: CARDIOLOGY | Facility: CLINIC | Age: 65
End: 2025-04-02
Payer: COMMERCIAL

## 2025-04-02 VITALS
HEART RATE: 69 BPM | SYSTOLIC BLOOD PRESSURE: 138 MMHG | BODY MASS INDEX: 25.27 KG/M2 | WEIGHT: 161 LBS | DIASTOLIC BLOOD PRESSURE: 81 MMHG | HEIGHT: 67 IN | OXYGEN SATURATION: 98 %

## 2025-04-02 DIAGNOSIS — Z79.899 HIGH RISK MEDICATION USE: ICD-10-CM

## 2025-04-02 DIAGNOSIS — I47.19 ATRIAL TACHYCARDIA, PAROXYSMAL (CMS-HCC): Primary | ICD-10-CM

## 2025-04-02 DIAGNOSIS — I48.0 PAROXYSMAL ATRIAL FIBRILLATION (MULTI): ICD-10-CM

## 2025-04-02 DIAGNOSIS — R00.2 HEART PALPITATIONS: ICD-10-CM

## 2025-04-02 DIAGNOSIS — I10 ESSENTIAL HYPERTENSION: ICD-10-CM

## 2025-04-02 PROCEDURE — 3075F SYST BP GE 130 - 139MM HG: CPT | Performed by: INTERNAL MEDICINE

## 2025-04-02 PROCEDURE — 3079F DIAST BP 80-89 MM HG: CPT | Performed by: INTERNAL MEDICINE

## 2025-04-02 PROCEDURE — 99214 OFFICE O/P EST MOD 30 MIN: CPT | Performed by: INTERNAL MEDICINE

## 2025-04-02 PROCEDURE — 93010 ELECTROCARDIOGRAM REPORT: CPT | Performed by: STUDENT IN AN ORGANIZED HEALTH CARE EDUCATION/TRAINING PROGRAM

## 2025-04-02 PROCEDURE — 93005 ELECTROCARDIOGRAM TRACING: CPT | Performed by: INTERNAL MEDICINE

## 2025-04-02 PROCEDURE — 3008F BODY MASS INDEX DOCD: CPT | Performed by: INTERNAL MEDICINE

## 2025-04-02 PROCEDURE — 1036F TOBACCO NON-USER: CPT | Performed by: INTERNAL MEDICINE

## 2025-04-02 RX ORDER — FLECAINIDE ACETATE 50 MG/1
50 TABLET ORAL 2 TIMES DAILY
Qty: 180 TABLET | Refills: 3 | Status: SHIPPED | OUTPATIENT
Start: 2025-04-02 | End: 2026-04-02

## 2025-04-02 ASSESSMENT — PAIN SCALES - GENERAL: PAINLEVEL_OUTOF10: 0-NO PAIN

## 2025-04-02 NOTE — PROGRESS NOTES
"    History Of Present Illness:      This is a 64-year-old female with a history of supraventricular tachycardia.  The patient has been on flecainide for suppression of atrial tachycardia.  She occasionally has some brief episodes of palpitations but the rhythm has been well controlled.  Her blood pressure readings have been quite variable.  At times she has had systolic blood pressure readings as low as 90 to 100 mmHg and as high as 160/100.    Review of Systems  Other review of systems negative  Last Recorded Vitals:      3/6/2023     9:24 AM 3/21/2023     1:29 PM 4/10/2023     9:19 AM 5/18/2023     9:07 AM 8/11/2023     9:08 AM 2/7/2024     9:16 AM 4/2/2025     3:00 PM   Vitals   Systolic 166 120 118 132 110 120 138   Diastolic 94 70 76 88 76 78 81   BP Location      Right arm Left arm   Heart Rate   67 71 75 66 69   Resp    16      Height  1.702 m (5' 7\") 1.702 m (5' 7\") 1.702 m (5' 7\") 1.702 m (5' 7\") 1.702 m (5' 7\") 1.702 m (5' 7\")   Weight (lb)  180 186 188 183 176 161   BMI  28.19 kg/m2 29.13 kg/m2 29.44 kg/m2 28.66 kg/m2 27.57 kg/m2 25.22 kg/m2   BSA (m2)  1.96 m2 2 m2 2.01 m2 1.98 m2 1.94 m2 1.86 m2   Visit Report  Report    Report Report     Allergies:  Propoxyphene n-acetaminophen  Outpatient Medications:  Current Outpatient Medications   Medication Instructions    dilTIAZem XR (DILACOR XR) 120 mg, oral, Daily    flecainide (TAMBOCOR) 50 mg, oral, 2 times daily    hydroCHLOROthiazide (MICROZIDE) 12.5 mg, oral, Daily    valsartan (DIOVAN) 80 mg, oral, Daily       Physical Exam:    General Appearance:  Alert, oriented, no distress  Skin:  Warm and dry  Head and Neck:  No elevation of JVP, no carotid bruits  Cardiac Exam:  Rhythm is regular, S1 and S2 are normal, no murmur S3 or S4  Lungs:  Clear to auscultation  Extremities:  no edema  Neurologic:  No focal deficits  Psychiatric:  Appropriate mood and behavior    Lab Results:    CMP:  Recent Labs     03/24/23  0817 03/14/23  0813 03/22/22  0805 " 11/17/20  1143 01/22/20  0942    141 140 140 141   K 4.2 3.8 4.3 3.6 4.5    104 106 106 103   CO2 28 30 28 27 28   ANIONGAP 13 11 10 11 15   BUN 21 15 19 15 17   CREATININE 0.86 1.02 0.90 0.76 0.92   MG  --  2.00  --   --   --      Recent Labs     03/22/22  0805   ALBUMIN 4.1   ALKPHOS 39   ALT 10   AST 13   BILITOT 0.4     CBC:  Recent Labs     03/14/23  0813 03/22/22  0805 11/17/20  1143   WBC 4.4 4.3* 5.3   HGB 10.9* 11.0* 12.9   HCT 34.4* 35.4* 39.1    252 245   MCV 84 92 93     COAG:   Recent Labs     03/22/22  0805 11/17/20  1143   INR 1.0 1.0     Cardiology Tests (personally reviewed):    EKG:  NSR with no ischemic changes   Echo Aug 2023:  Normal LV with mild TR  Coronary artery calcium score June 2023: 0  EKG April 2, 2025: Normal sinus rhythm, no ischemic changes or ventricular preexcitation    Assessment/Plan   Problem List Items Addressed This Visit             ICD-10-CM    Atrial tachycardia, paroxysmal (CMS-HCC) - Primary I47.19     Aristeo has a history of atrial tachycardia which is being treated with flecainide.  She has had no sustained episodes of this arrhythmia and overall is doing very well on medical therapy.  We will continue the flecainide at the same dosage.         Relevant Orders    ECG 12 lead (Clinic Performed) (Completed)    Essential hypertension I10     Blood pressure readings have been labile at home.  I have advised her to hold the hydrochlorothiazide if she is hypotensive.  Diovan will remain at the same dosage.         Heart palpitations R00.2    Paroxysmal atrial fibrillation (Multi) I48.0    Relevant Medications    flecainide (Tambocor) 50 mg tablet    High risk medication use Z79.899     No side effects reported on flecainide.  QRS duration is 92 ms.          James C Ramicone, DO

## 2025-04-02 NOTE — ASSESSMENT & PLAN NOTE
Blood pressure readings have been labile at home.  I have advised her to hold the hydrochlorothiazide if she is hypotensive.  Waqarvan will remain at the same dosage.

## 2025-04-02 NOTE — ASSESSMENT & PLAN NOTE
Aristeo has a history of atrial tachycardia which is being treated with flecainide.  She has had no sustained episodes of this arrhythmia and overall is doing very well on medical therapy.  We will continue the flecainide at the same dosage.

## 2025-04-02 NOTE — PATIENT INSTRUCTIONS
Hold hydrochlorothiazide 12.5 mg when your systolic BP is below 120 mmHg.    Follow up with Dr. Ramicone in 1 year

## 2025-04-04 LAB
ATRIAL RATE: 63 BPM
P AXIS: 53 DEGREES
P OFFSET: 189 MS
P ONSET: 135 MS
PR INTERVAL: 172 MS
Q ONSET: 221 MS
QRS COUNT: 10 BEATS
QRS DURATION: 92 MS
QT INTERVAL: 416 MS
QTC CALCULATION(BAZETT): 425 MS
QTC FREDERICIA: 422 MS
R AXIS: 52 DEGREES
T AXIS: 35 DEGREES
T OFFSET: 429 MS
VENTRICULAR RATE: 63 BPM

## 2025-06-25 DIAGNOSIS — Z12.31 ENCOUNTER FOR SCREENING MAMMOGRAM FOR BREAST CANCER: ICD-10-CM

## 2025-07-28 ENCOUNTER — APPOINTMENT (OUTPATIENT)
Dept: RADIOLOGY | Facility: HOSPITAL | Age: 65
End: 2025-07-28
Payer: COMMERCIAL

## 2025-07-28 VITALS — WEIGHT: 161 LBS | HEIGHT: 67 IN | BODY MASS INDEX: 25.27 KG/M2

## 2025-07-28 DIAGNOSIS — Z12.31 ENCOUNTER FOR SCREENING MAMMOGRAM FOR BREAST CANCER: ICD-10-CM

## 2025-07-28 PROCEDURE — 77063 BREAST TOMOSYNTHESIS BI: CPT

## 2025-07-28 PROCEDURE — 77063 BREAST TOMOSYNTHESIS BI: CPT | Performed by: RADIOLOGY

## 2025-07-28 PROCEDURE — 77067 SCR MAMMO BI INCL CAD: CPT | Performed by: RADIOLOGY

## 2025-07-31 ENCOUNTER — APPOINTMENT (OUTPATIENT)
Dept: PRIMARY CARE | Facility: CLINIC | Age: 65
End: 2025-07-31
Payer: COMMERCIAL

## 2025-07-31 ENCOUNTER — HOSPITAL ENCOUNTER (OUTPATIENT)
Dept: RADIOLOGY | Facility: CLINIC | Age: 65
Discharge: HOME | End: 2025-07-31
Payer: MEDICARE

## 2025-07-31 VITALS
HEIGHT: 67 IN | OXYGEN SATURATION: 98 % | SYSTOLIC BLOOD PRESSURE: 127 MMHG | DIASTOLIC BLOOD PRESSURE: 76 MMHG | WEIGHT: 164.5 LBS | HEART RATE: 72 BPM | BODY MASS INDEX: 25.82 KG/M2

## 2025-07-31 DIAGNOSIS — Z00.00 ROUTINE GENERAL MEDICAL EXAMINATION AT HEALTH CARE FACILITY: Primary | ICD-10-CM

## 2025-07-31 DIAGNOSIS — M85.80 OSTEOPENIA AFTER MENOPAUSE: ICD-10-CM

## 2025-07-31 DIAGNOSIS — Z78.0 OSTEOPENIA AFTER MENOPAUSE: ICD-10-CM

## 2025-07-31 DIAGNOSIS — M25.551 RIGHT HIP PAIN: ICD-10-CM

## 2025-07-31 DIAGNOSIS — E03.9 HYPOTHYROIDISM, UNSPECIFIED TYPE: ICD-10-CM

## 2025-07-31 DIAGNOSIS — M85.80 OSTEOPENIA, UNSPECIFIED LOCATION: ICD-10-CM

## 2025-07-31 DIAGNOSIS — M25.561 RIGHT KNEE PAIN, UNSPECIFIED CHRONICITY: ICD-10-CM

## 2025-07-31 PROCEDURE — 1159F MED LIST DOCD IN RCRD: CPT | Performed by: STUDENT IN AN ORGANIZED HEALTH CARE EDUCATION/TRAINING PROGRAM

## 2025-07-31 PROCEDURE — 1160F RVW MEDS BY RX/DR IN RCRD: CPT | Performed by: STUDENT IN AN ORGANIZED HEALTH CARE EDUCATION/TRAINING PROGRAM

## 2025-07-31 PROCEDURE — 99213 OFFICE O/P EST LOW 20 MIN: CPT | Performed by: STUDENT IN AN ORGANIZED HEALTH CARE EDUCATION/TRAINING PROGRAM

## 2025-07-31 PROCEDURE — 1036F TOBACCO NON-USER: CPT | Performed by: STUDENT IN AN ORGANIZED HEALTH CARE EDUCATION/TRAINING PROGRAM

## 2025-07-31 PROCEDURE — 1158F ADVNC CARE PLAN TLK DOCD: CPT | Performed by: STUDENT IN AN ORGANIZED HEALTH CARE EDUCATION/TRAINING PROGRAM

## 2025-07-31 PROCEDURE — 73502 X-RAY EXAM HIP UNI 2-3 VIEWS: CPT | Mod: RT

## 2025-07-31 PROCEDURE — G0444 DEPRESSION SCREEN ANNUAL: HCPCS | Performed by: STUDENT IN AN ORGANIZED HEALTH CARE EDUCATION/TRAINING PROGRAM

## 2025-07-31 PROCEDURE — 73502 X-RAY EXAM HIP UNI 2-3 VIEWS: CPT | Mod: RIGHT SIDE | Performed by: RADIOLOGY

## 2025-07-31 PROCEDURE — 3074F SYST BP LT 130 MM HG: CPT | Performed by: STUDENT IN AN ORGANIZED HEALTH CARE EDUCATION/TRAINING PROGRAM

## 2025-07-31 PROCEDURE — G0446 INTENS BEHAVE THER CARDIO DX: HCPCS | Performed by: STUDENT IN AN ORGANIZED HEALTH CARE EDUCATION/TRAINING PROGRAM

## 2025-07-31 PROCEDURE — 1123F ACP DISCUSS/DSCN MKR DOCD: CPT | Performed by: STUDENT IN AN ORGANIZED HEALTH CARE EDUCATION/TRAINING PROGRAM

## 2025-07-31 PROCEDURE — 1170F FXNL STATUS ASSESSED: CPT | Performed by: STUDENT IN AN ORGANIZED HEALTH CARE EDUCATION/TRAINING PROGRAM

## 2025-07-31 PROCEDURE — 3078F DIAST BP <80 MM HG: CPT | Performed by: STUDENT IN AN ORGANIZED HEALTH CARE EDUCATION/TRAINING PROGRAM

## 2025-07-31 PROCEDURE — 3008F BODY MASS INDEX DOCD: CPT | Performed by: STUDENT IN AN ORGANIZED HEALTH CARE EDUCATION/TRAINING PROGRAM

## 2025-07-31 PROCEDURE — G0439 PPPS, SUBSEQ VISIT: HCPCS | Performed by: STUDENT IN AN ORGANIZED HEALTH CARE EDUCATION/TRAINING PROGRAM

## 2025-07-31 RX ORDER — MELOXICAM 15 MG/1
15 TABLET ORAL DAILY PRN
Qty: 30 TABLET | Refills: 11 | Status: SHIPPED | OUTPATIENT
Start: 2025-07-31 | End: 2026-07-31

## 2025-07-31 ASSESSMENT — ENCOUNTER SYMPTOMS
LIGHT-HEADEDNESS: 0
FLANK PAIN: 0
CHEST TIGHTNESS: 0
VOMITING: 0
FEVER: 0
HEMATOLOGIC/LYMPHATIC NEGATIVE: 1
ALLERGIC/IMMUNOLOGIC NEGATIVE: 1
ENDOCRINE NEGATIVE: 1
RHINORRHEA: 0
NAUSEA: 0
ARTHRALGIAS: 1
COUGH: 0
NERVOUS/ANXIOUS: 0
DIZZINESS: 0
SHORTNESS OF BREATH: 0
ABDOMINAL PAIN: 0
FATIGUE: 0
JOINT SWELLING: 0
DIFFICULTY URINATING: 0
FACIAL ASYMMETRY: 0
PALPITATIONS: 0
PSYCHIATRIC NEGATIVE: 1
RESPIRATORY NEGATIVE: 1
SORE THROAT: 0
NEUROLOGICAL NEGATIVE: 1
DEPRESSION: 0
DIAPHORESIS: 0
BACK PAIN: 0
CONSTITUTIONAL NEGATIVE: 1
HEADACHES: 0
WEAKNESS: 0
CONSTIPATION: 0
BLOOD IN STOOL: 0
CHILLS: 0
LOSS OF SENSATION IN FEET: 0
GASTROINTESTINAL NEGATIVE: 1
HEMATURIA: 0
EYES NEGATIVE: 1
WHEEZING: 0
APPETITE CHANGE: 0
DYSURIA: 0
MYALGIAS: 0
DIARRHEA: 0
UNEXPECTED WEIGHT CHANGE: 0
OCCASIONAL FEELINGS OF UNSTEADINESS: 0
CARDIOVASCULAR NEGATIVE: 1
WOUND: 0
FREQUENCY: 0

## 2025-07-31 ASSESSMENT — ACTIVITIES OF DAILY LIVING (ADL)
DOING_HOUSEWORK: INDEPENDENT
MANAGING_FINANCES: INDEPENDENT
DRESSING: INDEPENDENT
TAKING_MEDICATION: INDEPENDENT
BATHING: INDEPENDENT
GROCERY_SHOPPING: INDEPENDENT

## 2025-07-31 ASSESSMENT — PATIENT HEALTH QUESTIONNAIRE - PHQ9
SUM OF ALL RESPONSES TO PHQ9 QUESTIONS 1 AND 2: 0
SUM OF ALL RESPONSES TO PHQ9 QUESTIONS 1 AND 2: 0
2. FEELING DOWN, DEPRESSED OR HOPELESS: NOT AT ALL
2. FEELING DOWN, DEPRESSED OR HOPELESS: NOT AT ALL
1. LITTLE INTEREST OR PLEASURE IN DOING THINGS: NOT AT ALL
1. LITTLE INTEREST OR PLEASURE IN DOING THINGS: NOT AT ALL
2. FEELING DOWN, DEPRESSED OR HOPELESS: NOT AT ALL
1. LITTLE INTEREST OR PLEASURE IN DOING THINGS: NOT AT ALL
SUM OF ALL RESPONSES TO PHQ9 QUESTIONS 1 AND 2: 0

## 2025-07-31 ASSESSMENT — COLUMBIA-SUICIDE SEVERITY RATING SCALE - C-SSRS
2. HAVE YOU ACTUALLY HAD ANY THOUGHTS OF KILLING YOURSELF?: NO
1. IN THE PAST MONTH, HAVE YOU WISHED YOU WERE DEAD OR WISHED YOU COULD GO TO SLEEP AND NOT WAKE UP?: NO
6. HAVE YOU EVER DONE ANYTHING, STARTED TO DO ANYTHING, OR PREPARED TO DO ANYTHING TO END YOUR LIFE?: NO

## 2025-07-31 NOTE — PROGRESS NOTES
"Subjective   Reason for Visit: Aristeo Hidalgo is an 65 y.o. female here for a Medicare Wellness visit.          Reviewed all medications by prescribing practitioner or clinical pharmacist (such as prescriptions, OTCs, herbal therapies and supplements) and documented in the medical record.    HPI    Patient Care Team:  Isaias Cook DO as PCP - General (Hospitalist)  James C Ramicone, DO as Consulting Physician (Cardiology)     Review of Systems    Objective   Vitals:  /76 (BP Location: Left arm, Patient Position: Sitting)   Pulse 72   Ht 1.702 m (5' 7\")   Wt 74.6 kg (164 lb 8 oz)   SpO2 98%   BMI 25.76 kg/m²       Physical Exam    Assessment & Plan  Routine general medical examination at health care facility    Orders:    1 Year Follow Up In Primary Care - Wellness Exam; Future    Basic Metabolic Panel; Future    Hemoglobin A1C - Lab collect; Future           Cardiac Risk Assessment  15 - 20 minutes were spent discussing Cardiovascular risk and, if needed, lifestyle modifications were recommended, including nutritional choices, exercise, and elimination of habits contributing to risk.   Aspirin use/disuse was discussed following the guidelines below:  low dose ASA ( mg) should be considered:    If prior Heart Attack/Stroke/Peripheral vascular disease:  Generally recommend daily low dose aspirin unless extremely high bleeding risk (e.g., gastrointestinal).    If no prior Heart Attack/Stroke/Peripheral vascular disease:              Age over 70: Do not use Aspirin for prevention    Age less than 70 and 10-year cardiovascular disease risk is >20%: use low dose Aspirin for prevention.                 Depression Screening  5 - 10 minutes were spent screening for depression.       anita Hidalgo is a 65 y.o. female who presents for Medicare Annual Wellness Visit Initial.     Patient is presenting the office today with a major complaint of right knee and hip pain that she has been experiencing for many " months.  For her knee pain has been experiencing this for many years for which she underwent x-ray and MRI of the knee which showed some degenerative changes.  She reported that the pain does not affect her daily activities and it can be managed with physical exercise as well as ibuprofen.  She also reported that her right knee sometimes gets swollen however with elevation and rest the swelling improves.  Regarding her hip pain, she reported that it presents as right groin pain that gets worse with prolonged immobilization and improves with activity.  She denied having any fever, chills, lightheadedness, dizziness, chest pain, abdominal pain, recent change in bowel habits, urinary symptoms.  Examination did not show any signs of sciatica.          Review of Systems   Constitutional: Negative.  Negative for appetite change, chills, diaphoresis, fatigue, fever and unexpected weight change.   HENT:  Negative for congestion, rhinorrhea, sneezing and sore throat.    Eyes: Negative.    Respiratory: Negative.  Negative for cough, chest tightness, shortness of breath and wheezing.    Cardiovascular: Negative.  Negative for chest pain, palpitations and leg swelling.   Gastrointestinal: Negative.  Negative for abdominal pain, blood in stool, constipation, diarrhea, nausea and vomiting.   Endocrine: Negative.    Genitourinary: Negative.  Negative for difficulty urinating, dysuria, flank pain, frequency, hematuria and urgency.   Musculoskeletal:  Positive for arthralgias (Right hip and knee pain). Negative for back pain, joint swelling and myalgias.   Skin: Negative.  Negative for pallor, rash and wound.   Allergic/Immunologic: Negative.    Neurological: Negative.  Negative for dizziness, facial asymmetry, weakness, light-headedness and headaches.   Hematological: Negative.    Psychiatric/Behavioral: Negative.  Negative for behavioral problems. The patient is not nervous/anxious.         Objective  /76 (BP Location: Left  "arm, Patient Position: Sitting)   Pulse 72   Ht 1.702 m (5' 7\")   Wt 74.6 kg (164 lb 8 oz)   SpO2 98%   BMI 25.76 kg/m²      Physical Exam  Constitutional:       General: She is not in acute distress.     Appearance: Normal appearance. She is normal weight. She is not ill-appearing.   HENT:      Head: Normocephalic and atraumatic.      Right Ear: External ear normal.      Left Ear: External ear normal.      Nose: Nose normal. No rhinorrhea.      Mouth/Throat:      Mouth: Mucous membranes are moist.      Pharynx: Oropharynx is clear. No oropharyngeal exudate or posterior oropharyngeal erythema.      Eyes:      Extraocular Movements: Extraocular movements intact.      Conjunctiva/sclera: Conjunctivae normal.      Pupils: Pupils are equal, round, and reactive to light.         Cardiovascular:      Rate and Rhythm: Normal rate and regular rhythm.      Pulses: Normal pulses.      Heart sounds: Normal heart sounds. No murmur heard.  Pulmonary:      Effort: Pulmonary effort is normal. No respiratory distress.      Breath sounds: Normal breath sounds. No wheezing, rhonchi or rales.   Abdominal:      General: Abdomen is flat. Bowel sounds are normal. There is no distension.      Palpations: Abdomen is soft.      Tenderness: There is no abdominal tenderness.      Musculoskeletal:         General: Tenderness (Mild tenderness over the right knee and hip with no signs of sciatica) present. Normal range of motion.      Cervical back: Normal range of motion and neck supple.      Right lower leg: No edema.      Left lower leg: No edema.      Skin:     General: Skin is warm and dry.      Neurological:      General: No focal deficit present.      Mental Status: She is alert and oriented to person, place, and time.      Psychiatric:         Mood and Affect: Mood normal.         Behavior: Behavior normal.           Assessment/Plan  Patient was seen for follow-up     # Right knee pain  # Right hip pain  - Pain management with " over-the-counter Tylenol/meloxicam.  - Right right hip x-ray  - Referral to orthopedic surgery  - Referral to physical therapy, Future     # Headache  Patient reported having headache that she feels most behind her eyes.  She got her eyes checked recently and received a new prescription.  She reported that the headache lasts for few minutes and improves on its own with no intervention.  - Will monitor at this time with no further intervention.     #Hypertension  -Blood pressure is currently being managed with valsartan and diltiazem.  Hydrochlorothiazide was held which the patient reports improvement in her symptoms after that.  -consider workup for secondary causes if poorly controlled     #pSVT and atrial tachycardia  -Follows with Dr. Ramicone. On flecainide and diltiazem.  Could be related to underlying symptoms, consider cardiac event monitor follow [facility] cardiology     #Health maintenance  -Labs as above, CBC, BMP, TSH with reflex to T4, vitamin D level, A1c.  -Up-to-date on her vaccination.  - Colonoscopy was done 1 year ago and mammogram was done 1 week ago.  Will order DEXA scan at this visit.        RTC in 3-6 months or prn     Discussed with attending,  Leslye Gavin MD.   Internal Medicine Resident, PGY-2    Patient seen in conjunction with resident physician.  Continue follow-up with cardiology, potentially can discontinue blood pressure medications of hydrochlorothiazide, if blood pressure continues at goal.  Check lab work today, significant issues with right hip and right knee pain.  Recommend follow-up with orthopedics.  Referral placed.  Likely would benefit from injections.  Mobic. Discussed side effect profile.  Otherwise, no additional issues.

## 2025-07-31 NOTE — PROGRESS NOTES
Subjective   Patient ID: Aristeo Hidalgo is a 65 y.o. female who presents for Medicare Annual Wellness Visit Initial.    Patient is presenting the office today with a major complaint of right knee and hip pain that she has been experiencing for many months.  For her knee pain has been experiencing this for many years for which she underwent x-ray and MRI of the knee which showed some degenerative changes.  She reported that the pain does not affect her daily activities and it can be managed with physical exercise as well as ibuprofen.  She also reported that her right knee sometimes gets swollen however with elevation and rest the swelling improves.  Regarding her hip pain, she reported that it presents as right groin pain that gets worse with prolonged immobilization and improves with activity.  She denied having any fever, chills, lightheadedness, dizziness, chest pain, abdominal pain, recent change in bowel habits, urinary symptoms.  Examination did not show any signs of sciatica.         Review of Systems   Constitutional: Negative.  Negative for appetite change, chills, diaphoresis, fatigue, fever and unexpected weight change.   HENT:  Negative for congestion, rhinorrhea, sneezing and sore throat.    Eyes: Negative.    Respiratory: Negative.  Negative for cough, chest tightness, shortness of breath and wheezing.    Cardiovascular: Negative.  Negative for chest pain, palpitations and leg swelling.   Gastrointestinal: Negative.  Negative for abdominal pain, blood in stool, constipation, diarrhea, nausea and vomiting.   Endocrine: Negative.    Genitourinary: Negative.  Negative for difficulty urinating, dysuria, flank pain, frequency, hematuria and urgency.   Musculoskeletal:  Positive for arthralgias (Right hip and knee pain). Negative for back pain, joint swelling and myalgias.   Skin: Negative.  Negative for pallor, rash and wound.   Allergic/Immunologic: Negative.    Neurological: Negative.  Negative for  "dizziness, facial asymmetry, weakness, light-headedness and headaches.   Hematological: Negative.    Psychiatric/Behavioral: Negative.  Negative for behavioral problems. The patient is not nervous/anxious.        Objective   /76 (BP Location: Left arm, Patient Position: Sitting)   Pulse 72   Ht 1.702 m (5' 7\")   Wt 74.6 kg (164 lb 8 oz)   SpO2 98%   BMI 25.76 kg/m²     Physical Exam  Constitutional:       General: She is not in acute distress.     Appearance: Normal appearance. She is normal weight. She is not ill-appearing.   HENT:      Head: Normocephalic and atraumatic.      Right Ear: External ear normal.      Left Ear: External ear normal.      Nose: Nose normal. No rhinorrhea.      Mouth/Throat:      Mouth: Mucous membranes are moist.      Pharynx: Oropharynx is clear. No oropharyngeal exudate or posterior oropharyngeal erythema.     Eyes:      Extraocular Movements: Extraocular movements intact.      Conjunctiva/sclera: Conjunctivae normal.      Pupils: Pupils are equal, round, and reactive to light.       Cardiovascular:      Rate and Rhythm: Normal rate and regular rhythm.      Pulses: Normal pulses.      Heart sounds: Normal heart sounds. No murmur heard.  Pulmonary:      Effort: Pulmonary effort is normal. No respiratory distress.      Breath sounds: Normal breath sounds. No wheezing, rhonchi or rales.   Abdominal:      General: Abdomen is flat. Bowel sounds are normal. There is no distension.      Palpations: Abdomen is soft.      Tenderness: There is no abdominal tenderness.     Musculoskeletal:         General: Tenderness (Mild tenderness over the right knee and hip with no signs of sciatica) present. Normal range of motion.      Cervical back: Normal range of motion and neck supple.      Right lower leg: No edema.      Left lower leg: No edema.     Skin:     General: Skin is warm and dry.     Neurological:      General: No focal deficit present.      Mental Status: She is alert and oriented " to person, place, and time.     Psychiatric:         Mood and Affect: Mood normal.         Behavior: Behavior normal.         Assessment/Plan     Patient was seen for follow-up    # Right knee pain  # Right hip pain  - Pain management with over-the-counter Tylenol/meloxicam.  - Right right hip x-ray  - Referral to orthopedic surgery  - Referral to physical therapy, Future    # Headache  Patient reported having headache that she feels most behind her eyes.  She got her eyes checked recently and received a new prescription.  She reported that the headache lasts for few minutes and improves on its own with no intervention.  - Will monitor at this time with no further intervention.    #Hypertension  -Blood pressure is currently being managed with valsartan and diltiazem.  Hydrochlorothiazide was held which the patient reports improvement in her symptoms after that.  -consider workup for secondary causes if poorly controlled     #pSVT and atrial tachycardia  -Follows with Dr. Ramicone. On flecainide and diltiazem.  Could be related to underlying symptoms, consider cardiac event monitor follow [facility] cardiology     #Health maintenance  -Labs as above, CBC, BMP, TSH with reflex to T4, vitamin D level, A1c.  -Up-to-date on her vaccination.  - Colonoscopy was done 1 year ago and mammogram was done 1 week ago.  Will order DEXA scan at this visit.        RTC in 3-6 months or prn    Discussed with attending,  Leslye Gavin MD.   Internal Medicine Resident, PGY-2

## 2025-08-01 LAB
25(OH)D3+25(OH)D2 SERPL-MCNC: 46 NG/ML (ref 30–100)
ANION GAP SERPL CALCULATED.4IONS-SCNC: 12 MMOL/L (CALC) (ref 7–17)
BUN SERPL-MCNC: 14 MG/DL (ref 7–25)
BUN/CREAT SERPL: NORMAL (CALC) (ref 6–22)
CALCIUM SERPL-MCNC: 10.2 MG/DL (ref 8.6–10.4)
CHLORIDE SERPL-SCNC: 102 MMOL/L (ref 98–110)
CO2 SERPL-SCNC: 27 MMOL/L (ref 20–32)
CREAT SERPL-MCNC: 0.9 MG/DL (ref 0.5–1.05)
EGFRCR SERPLBLD CKD-EPI 2021: 71 ML/MIN/1.73M2
ERYTHROCYTE [DISTWIDTH] IN BLOOD BY AUTOMATED COUNT: 12.3 % (ref 11–15)
EST. AVERAGE GLUCOSE BLD GHB EST-MCNC: 105 MG/DL
EST. AVERAGE GLUCOSE BLD GHB EST-SCNC: 5.8 MMOL/L
GLUCOSE SERPL-MCNC: 78 MG/DL (ref 65–99)
HBA1C MFR BLD: 5.3 %
HCT VFR BLD AUTO: 38 % (ref 35–45)
HGB BLD-MCNC: 12.6 G/DL (ref 11.7–15.5)
MCH RBC QN AUTO: 31.3 PG (ref 27–33)
MCHC RBC AUTO-ENTMCNC: 33.2 G/DL (ref 32–36)
MCV RBC AUTO: 94.5 FL (ref 80–100)
PLATELET # BLD AUTO: 280 THOUSAND/UL (ref 140–400)
PMV BLD REES-ECKER: 10.5 FL (ref 7.5–12.5)
POTASSIUM SERPL-SCNC: 4 MMOL/L (ref 3.5–5.3)
RBC # BLD AUTO: 4.02 MILLION/UL (ref 3.8–5.1)
SODIUM SERPL-SCNC: 141 MMOL/L (ref 135–146)
TSH SERPL-ACNC: 1.58 MIU/L (ref 0.4–4.5)
WBC # BLD AUTO: 6.1 THOUSAND/UL (ref 3.8–10.8)

## 2025-08-04 ENCOUNTER — APPOINTMENT (OUTPATIENT)
Dept: RADIOLOGY | Facility: HOSPITAL | Age: 65
End: 2025-08-04
Payer: MEDICARE

## 2025-08-04 DIAGNOSIS — M85.80 OSTEOPENIA, UNSPECIFIED LOCATION: ICD-10-CM

## 2025-08-04 DIAGNOSIS — M85.80 OSTEOPENIA AFTER MENOPAUSE: ICD-10-CM

## 2025-08-04 DIAGNOSIS — Z78.0 OSTEOPENIA AFTER MENOPAUSE: ICD-10-CM

## 2025-08-04 PROCEDURE — 77080 DXA BONE DENSITY AXIAL: CPT | Performed by: RADIOLOGY

## 2025-08-04 PROCEDURE — 77080 DXA BONE DENSITY AXIAL: CPT

## 2025-08-20 ENCOUNTER — HOSPITAL ENCOUNTER (OUTPATIENT)
Dept: RADIOLOGY | Facility: CLINIC | Age: 65
Discharge: HOME | End: 2025-08-20
Payer: MEDICARE

## 2025-08-20 ENCOUNTER — APPOINTMENT (OUTPATIENT)
Dept: ORTHOPEDIC SURGERY | Facility: CLINIC | Age: 65
End: 2025-08-20
Payer: MEDICARE

## 2025-08-20 VITALS — HEIGHT: 67 IN | WEIGHT: 164 LBS | BODY MASS INDEX: 25.74 KG/M2

## 2025-08-20 DIAGNOSIS — M25.561 RIGHT KNEE PAIN, UNSPECIFIED CHRONICITY: ICD-10-CM

## 2025-08-20 DIAGNOSIS — M17.10 ARTHRITIS OF KNEE: Primary | ICD-10-CM

## 2025-08-20 PROCEDURE — 99204 OFFICE O/P NEW MOD 45 MIN: CPT | Performed by: ORTHOPAEDIC SURGERY

## 2025-08-20 PROCEDURE — 1036F TOBACCO NON-USER: CPT | Performed by: ORTHOPAEDIC SURGERY

## 2025-08-20 PROCEDURE — 73562 X-RAY EXAM OF KNEE 3: CPT | Mod: RT

## 2025-08-20 PROCEDURE — 73562 X-RAY EXAM OF KNEE 3: CPT | Mod: RIGHT SIDE | Performed by: STUDENT IN AN ORGANIZED HEALTH CARE EDUCATION/TRAINING PROGRAM

## 2025-08-20 PROCEDURE — 3008F BODY MASS INDEX DOCD: CPT | Performed by: ORTHOPAEDIC SURGERY

## 2025-08-20 PROCEDURE — 1159F MED LIST DOCD IN RCRD: CPT | Performed by: ORTHOPAEDIC SURGERY

## 2025-08-20 PROCEDURE — 20610 DRAIN/INJ JOINT/BURSA W/O US: CPT | Performed by: ORTHOPAEDIC SURGERY

## 2025-08-20 RX ORDER — METHYLPREDNISOLONE ACETATE 40 MG/ML
40 INJECTION, SUSPENSION INTRA-ARTICULAR; INTRALESIONAL; INTRAMUSCULAR; SOFT TISSUE
Status: COMPLETED | OUTPATIENT
Start: 2025-08-20 | End: 2025-08-20

## 2025-08-20 RX ORDER — LIDOCAINE HYDROCHLORIDE 20 MG/ML
2 INJECTION, SOLUTION INFILTRATION; PERINEURAL
Status: COMPLETED | OUTPATIENT
Start: 2025-08-20 | End: 2025-08-20

## 2025-08-20 RX ADMIN — LIDOCAINE HYDROCHLORIDE 2 ML: 20 INJECTION, SOLUTION INFILTRATION; PERINEURAL at 13:30

## 2025-08-20 RX ADMIN — METHYLPREDNISOLONE ACETATE 40 MG: 40 INJECTION, SUSPENSION INTRA-ARTICULAR; INTRALESIONAL; INTRAMUSCULAR; SOFT TISSUE at 13:30

## 2025-08-20 ASSESSMENT — PAIN - FUNCTIONAL ASSESSMENT: PAIN_FUNCTIONAL_ASSESSMENT: 0-10

## 2025-09-29 ENCOUNTER — APPOINTMENT (OUTPATIENT)
Dept: OPHTHALMOLOGY | Facility: CLINIC | Age: 65
End: 2025-09-29
Payer: MEDICARE

## 2026-04-06 ENCOUNTER — APPOINTMENT (OUTPATIENT)
Dept: CARDIOLOGY | Facility: CLINIC | Age: 66
End: 2026-04-06
Payer: COMMERCIAL